# Patient Record
Sex: FEMALE | Race: BLACK OR AFRICAN AMERICAN | NOT HISPANIC OR LATINO | URBAN - METROPOLITAN AREA
[De-identification: names, ages, dates, MRNs, and addresses within clinical notes are randomized per-mention and may not be internally consistent; named-entity substitution may affect disease eponyms.]

---

## 2022-10-27 ENCOUNTER — HOSPITAL ENCOUNTER (OUTPATIENT)
Dept: TELEMEDICINE | Facility: HOSPITAL | Age: 82
Discharge: HOME | End: 2022-10-27
Payer: MEDICARE

## 2022-10-27 DIAGNOSIS — R53.1 LEFT-SIDED WEAKNESS: ICD-10-CM

## 2022-10-27 PROCEDURE — G0425 INPT/ED TELECONSULT30: HCPCS | Mod: 95,G0,, | Performed by: STUDENT IN AN ORGANIZED HEALTH CARE EDUCATION/TRAINING PROGRAM

## 2022-10-27 PROCEDURE — G0425 PR INPT TELEHEALTH CONSULT 30M: ICD-10-PCS | Mod: 95,G0,, | Performed by: STUDENT IN AN ORGANIZED HEALTH CARE EDUCATION/TRAINING PROGRAM

## 2022-10-27 NOTE — HPI
"81 yo female w/ Hx of R parietal embolic stroke, on chronic AC (warfarin), Epilepsy, HTN, chronic pain who presents w/ acute encephalopathy and worsening LSW.   Was found in bed by the sitter very lethargic this AM, and had difficulty cooperating with her morning routine. Had a fall in the bathroom, and had difficulty assisting the sitter with getting herself up, as per daughter at bedside. This prompted the call to EMS.   Patient states that she is compliant with her medication and daughter is able to provide the medication list.   Compliant w/ Warfarin.   Daughter says that her mom's "eyes a re a little different" and feels that she is weaker in general.   "

## 2022-10-27 NOTE — ASSESSMENT & PLAN NOTE
81 yo female w/ Hx of R parietal embolic stroke, on chronic AC (warfarin), Epilepsy, HTN, chronic pain who presents w/ acute encephalopathy and worsening LSW.   Was found in bed by the sitter very lethargic this AM, and had difficulty cooperating with her morning routine. Had a fall in the bathroom, and had difficulty assisting the sitter with getting herself up, as per daughter at bedside. This prompted the call to EMS.   NIHSS 3. Per daughter, patient is compliant w/ warfarin, pending INR.   Would defer tPA/TNK at this time  Suspect this is patient's baseline weakness.   Would recommend toxic and metabolic work-up to ensure there is no infection that would contribute to patient's generalized symptoms/confusion as well as lower her seizure threshold.  If no evidence of infection/metabolic disturbances can obtain MRI Brain to further assess the brain parenchyma.   Recommend inpatient admission

## 2022-10-27 NOTE — SUBJECTIVE & OBJECTIVE
Woke up with symptoms?: {YES NO:97189}    Recent bleeding noted: no  Does the patient take any Blood Thinners? yes  Medications: Anticoagulants:  coumadin      Past Medical History: hypertension, Afib, Heart Problems and seizures    Past Surgical History: no major surgeries within the last 2 weeks    Family History: no relevant history    Social History: no smoking, no drinking, no drugs    Allergies: Allergies have not been reviewed No relevant allergies    Review of Systems   Constitutional: Positive for activity change and fatigue.   HENT: Negative.    Eyes: Negative.    Respiratory: Negative.    Cardiovascular: Negative.    Gastrointestinal: Negative for nausea and vomiting.   Musculoskeletal: Positive for gait problem.   Skin: Negative.    Neurological: Positive for facial asymmetry and weakness. Negative for numbness.   Hematological: Negative.    Psychiatric/Behavioral: Positive for decreased concentration.     Objective:   Vitals: There were no vitals taken for this visit. BP: 153/84 and Heart Rate: 73    CT READ: Yes  Abnormal CT R parietal lobe encephalomalacia .        Physical Exam  Constitutional:       Appearance: Normal appearance.      Comments: Thin, pleasant, elderly female    HENT:      Head: Atraumatic.   Eyes:      Extraocular Movements: Extraocular movements intact.   Cardiovascular:      Rate and Rhythm: Normal rate.   Pulmonary:      Effort: Pulmonary effort is normal.   Musculoskeletal:         General: Normal range of motion.   Neurological:      Mental Status: She is alert and oriented to person, place, and time.      Cranial Nerves: Cranial nerve deficit present.      Motor: Weakness present.      Comments: Mild LUE and LLE drift on exam.   No sensory deficits  No gaze preference

## 2022-10-27 NOTE — CONSULTS
[facility]  Vascular Neurology  Comprehensive Stroke Center  TeleVascular Neurology Acute Consultation Note      Consults    Consulting Provider: JEREMIAS WHITE  Current Providers  No providers found    Patient Location: [facility] Emergency Department  Spoke hospital nurse at bedside with patient assisting consultant.     Patient information was obtained from patient, relative(s), past medical records, and primary team.         Assessment/Plan:       Diagnoses:   * Left-sided weakness  81 yo female w/ Hx of R parietal embolic stroke, on chronic AC (warfarin), Epilepsy, HTN, chronic pain who presents w/ acute encephalopathy and worsening LSW.   Was found in bed by the sitter very lethargic this AM, and had difficulty cooperating with her morning routine. Had a fall in the bathroom, and had difficulty assisting the sitter with getting herself up, as per daughter at bedside. This prompted the call to EMS.   NIHSS 3. Per daughter, patient is compliant w/ warfarin, pending INR.   Would defer tPA/TNK at this time  Suspect this is patient's baseline weakness.   Would recommend toxic and metabolic work-up to ensure there is no infection that would contribute to patient's generalized symptoms/confusion as well as lower her seizure threshold.  If no evidence of infection/metabolic disturbances can obtain MRI Brain to further assess the brain parenchyma.   Recommend inpatient admission           STROKE DOCUMENTATION     Acute Stroke Times:   Acute Stroke Times   Last Known Normal Date: 10/26/22  Last Known Normal Time:  (found by her sitter this AM, not at her baseline, at around 08:00)  Unknown Normal Time: Unknown Time  Unknown Symptom Onset Date: Unknown Date  Unknown Symptom Onset Time: Unknown Time  Stroke Team Called Date: 10/27/22  Stroke Team Called Time: 1011  Stroke Team Arrival Date: 10/27/22  Stroke Team Arrival Time:  1018  CT Interpretation Time: 1020  Alteplase Recommended: No    NIH Scale:  1a. Level of Consciousness: 0-->Alert, keenly responsive  1b. LOC Questions: 0-->Answers both questions correctly  1c. LOC Commands: 0-->Performs both tasks correctly  2. Best Gaze: 0-->Normal  3. Visual: 0-->No visual loss  4. Facial Palsy: 1-->Minor paralysis (flattened nasolabial fold, asymmetry on smiling)  5a. Motor Arm, Left: 1-->Drift, limb holds 90 (or 45) degrees, but drifts down before full 10 seconds, does not hit bed or other support  5b. Motor Arm, Right: 0-->No drift, limb holds 90 (or 45) degrees for full 10 secs  6a. Motor Leg, Left: 1-->Drift, leg falls by the end of the 5-sec period but does not hit bed  6b. Motor Leg, Right: 0-->No drift, leg holds 30 degree position for full 5 secs  7. Limb Ataxia: 0-->Absent  8. Sensory: 0-->Normal, no sensory loss  9. Best Language: 0-->No aphasia, normal. Reading some words, spelling others. Language fluent, Answering appropriately although decreased concentration  10. Dysarthria: 0-->Normal  11. Extinction and Inattention (formerly Neglect): 0-->No abnormality  Total (NIH Stroke Scale): 3     Modified Анна Score: 3  Justin Coma Scale:    ABCD2 Score:    [id number] Score:   HAS -BLED Score:   ICH Score:   Hunt & Bolanos Classification:       There were no vitals taken for this visit.  Eligible for thrombolytic therapy?: No  Thrombolytic therapy recomended: Alteplase not recommended due to Outside of treatment window , Unknown/unclear onset , and Warfarin use, pending INR  Possible Interventional Revascularization Candidate? No; at this time symptoms not suggestive of large vessel occlusion    Disposition Recommendation: admit to inpatient    Subjective:     History of Present Illness:  83 yo female w/ Hx of R parietal embolic stroke, on chronic AC (warfarin), Epilepsy, HTN, chronic pain who presents w/ acute encephalopathy and worsening LSW.   Was found in bed by the sitter very  "lethargic this AM, and had difficulty cooperating with her morning routine. Had a fall in the bathroom, and had difficulty assisting the sitter with getting herself up, as per daughter at bedside. This prompted the call to EMS.   Patient states that she is compliant with her medication and daughter is able to provide the medication list.   Compliant w/ Warfarin.   Daughter says that her mom's "eyes a re a little different" and feels that she is weaker in general.       Woke up with symptoms?: yes    Recent bleeding noted: no  Does the patient take any Blood Thinners? yes  Medications: Anticoagulants:  coumadin      Past Medical History: hypertension, Afib, Heart Problems and seizures    Past Surgical History: no major surgeries within the last 2 weeks    Family History: no relevant history    Social History: no smoking, no drinking, no drugs    Allergies: Allergies have not been reviewed No relevant allergies    Review of Systems   Constitutional: Positive for activity change and fatigue.   HENT: Negative.    Eyes: Negative.    Respiratory: Negative.    Cardiovascular: Negative.    Gastrointestinal: Negative for nausea and vomiting.   Musculoskeletal: Positive for gait problem.   Skin: Negative.    Neurological: Positive for facial asymmetry and weakness. Negative for numbness.   Hematological: Negative.    Psychiatric/Behavioral: Positive for decreased concentration.     Objective:   Vitals: There were no vitals taken for this visit. BP: 153/84 and Heart Rate: 73    CT READ: Yes  Abnormal CT R parietal lobe encephalomalacia .        Physical Exam  Constitutional:       Appearance: Normal appearance.      Comments: Thin, pleasant, elderly female    HENT:      Head: Atraumatic.   Eyes:      Extraocular Movements: Extraocular movements intact.   Cardiovascular:      Rate and Rhythm: Normal rate.   Pulmonary:      Effort: Pulmonary effort is normal.   Musculoskeletal:         General: Normal range of motion. "   Neurological:      Mental Status: She is alert and oriented to person, place, and time.      Cranial Nerves: Cranial nerve deficit present.      Motor: Weakness present.      Comments: Mild LUE and LLE drift on exam.   No sensory deficits  No gaze preference              Recommended the emergency room physician to have a brief discussion with the patient and/or family if available regarding the  risks and benefits of treatment, and to briefly document the occurrence of that discussion in his clinical encounter note.     The attending portion of this evaluation, treatment, and documentation was performed per Susan Szymanski MD via audiovisual.    Billing code:  (non-intervention mild to moderate stroke, TIA, some mimics)      This patient has a critical neurological condition/illness, with some potential for high morbidity and mortality.  There is a moderate probability for acute neurological change leading to clinical and possibly life-threatening deterioration requiring highest level of physician preparedness for urgent intervention.  Care was coordinated with other physicians involved in the patient's care.  Radiologic studies and laboratory data were reviewed and interpreted, and plan of care was re-assessed based on the results.  Diagnosis, treatment options and prognosis may have been discussed with the patient and/or family members or caregiver.    In your opinion, this was a: Tier 2 Van Negative    Consult End Time: 11:23 AM     Susan Szymanski MD  [facility]  Vascular Neurology   [facility]